# Patient Record
Sex: FEMALE | Race: WHITE | NOT HISPANIC OR LATINO | Employment: FULL TIME | URBAN - METROPOLITAN AREA
[De-identification: names, ages, dates, MRNs, and addresses within clinical notes are randomized per-mention and may not be internally consistent; named-entity substitution may affect disease eponyms.]

---

## 2018-04-12 NOTE — PROGRESS NOTES
SUBJECTIVE:   CC: Madiha Maldonado is an 28 year old woman who presents for preventive health visit.     Physical   Annual:     Getting at least 3 servings of Calcium per day::  Yes    Bi-annual eye exam::  NO    Dental care twice a year::  Yes    Sleep apnea or symptoms of sleep apnea::  None    Diet::  Regular (no restrictions)    Frequency of exercise::  2-3 days/week    Duration of exercise::  30-45 minutes    Taking medications regularly::  Yes    Medication side effects::  None    Additional concerns today::  YES          She reports that she lost about 40 pounds with diet and exercise changes. She also quit smoking about a year ago.     Vaginal Symptoms  Onset: Last Saturday     Description: bleeding after intercourse   Vaginal Discharge: none   Itching (Pruritis): no   Burning sensation:  no   Odor: no     Accompanying Signs & Symptoms:  Pain with Urination: no   Abdominal Pain: YES, cramping   Fever: no     History:   Sexually active: YES  New Partner: no   Possibility of Pregnancy:  No    Precipitating factors:   Recent Antibiotic Use: no     Alleviating factors:  None     Therapies Tried and outcome: No therapies tired.     She noticed that starting last Saturday she had some bleeding, it stopped on Sunday. It had happened once before about a year ago. It felt similar to period pain for her. She first noticed it after intercourse, which wasn't painful, only a little bit uncomfortable.       Today's PHQ-2 Score:   PHQ-2 ( 1999 Pfizer) 4/17/2018   Q1: Little interest or pleasure in doing things 0   Q2: Feeling down, depressed or hopeless 0   PHQ-2 Score 0   Q1: Little interest or pleasure in doing things Not at all   Q2: Feeling down, depressed or hopeless Not at all   PHQ-2 Score 0     Abuse: Current or Past(Physical, Sexual or Emotional)- No  Do you feel safe in your environment - Yes    Social History   Substance Use Topics     Smoking status: Former Smoker     Packs/day: 0.50     Years: 3.00     Types:  Cigarettes     Quit date: 2017     Smokeless tobacco: Never Used     Alcohol use Yes      Comment: 1 x per week       Reviewed orders with patient.  Reviewed health maintenance and updated orders accordingly - Yes  BP Readings from Last 3 Encounters:   04/17/18 118/86   10/09/15 124/86   06/08/15 120/84    Wt Readings from Last 3 Encounters:   04/17/18 84.1 kg (185 lb 6.4 oz)   10/09/15 102.4 kg (225 lb 11.2 oz)   06/08/15 101.9 kg (224 lb 11.2 oz)         Mammogram not appropriate for this patient based on age.    Pertinent mammograms are reviewed under the imaging tab.  History of abnormal Pap smear: NO - age 30- 65 PAP every 3 years recommended    Reviewed and updated as needed this visit by clinical staff  Tobacco  Allergies  Meds  Med Hx  Surg Hx  Fam Hx  Soc Hx      Reviewed and updated as needed this visit by Provider  Meds        Past Medical History:   Diagnosis Date     Intermittent asthma     Secondary to infectious cause.     Seasonal allergic rhinitis       Past Surgical History:   Procedure Laterality Date     CYSTECTOMY PILONIDAL  01/20/2014    [facility]     TONSILLECTOMY         Review of Systems  C: NEGATIVE for fever, chills, change in weight  I: NEGATIVE for worrisome rashes, moles or lesions  E: NEGATIVE for vision changes or irritation  ENT: NEGATIVE for ear, mouth and throat problems  R: NEGATIVE for significant cough or SOB  B: NEGATIVE for masses, tenderness or discharge  CV: NEGATIVE for chest pain, palpitations or peripheral edema  GI: NEGATIVE for nausea, abdominal pain, heartburn, or change in bowel habits  : POSITIVE for abnormal vaginal bleeding NEGATIVE for unusual urinary symptoms. Doesn't get a period with the Nexplanon.  M: NEGATIVE for significant arthralgias or myalgia  N: NEGATIVE for weakness, dizziness or paresthesias  P: NEGATIVE for changes in mood or affect    This document serves as a record of the services and decisions personally performed and  "made by Shraddha Quinones MD. It was created on her behalf by Lizett Lombardi, a trained medical scribe. The creation of this document is based the provider's statements to the medical scribe.  Lizett Lombardi, April 17, 2018 11:50 AM      OBJECTIVE:   /86  Pulse 73  Temp 97.7  F (36.5  C) (Temporal)  Resp 18  Ht 1.613 m (5' 3.5\")  Wt 84.1 kg (185 lb 6.4 oz)  SpO2 100%  BMI 32.33 kg/m2  Physical Exam  GENERAL: healthy, alert and no distress  EYES: Eyes grossly normal to inspection, PERRL and conjunctivae and sclerae normal  HENT: ear canals and TM's normal, nose and mouth without ulcers or lesions  NECK: no adenopathy, no asymmetry, masses, or scars and thyroid normal to palpation  RESP: lungs clear to auscultation - no rales, rhonchi or wheezes  BREAST: normal without masses, tenderness or nipple discharge and no palpable axillary masses or adenopathy  CV: regular rate and rhythm, normal S1 S2, no S3 or S4, no murmur, click or rub, no peripheral edema and peripheral pulses strong  ABDOMEN: soft, nontender, no hepatosplenomegaly, no masses and bowel sounds normal   (female): normal female external genitalia, normal urethral meatus, vaginal mucosa pink, moist, well rugated, and normal cervix/adnexa/uterus without masses or discharge, some slight bleeding noted from cervix with pap.   MS: no gross musculoskeletal defects noted, no edema  SKIN: no suspicious lesions or rashes  NEURO: Normal strength and tone, mentation intact and speech normal  PSYCH: mentation appears normal, affect normal/bright    ASSESSMENT/PLAN:   1. Encounter for routine adult health examination without abnormal findings    2. Screening for malignant neoplasm of cervix  Due for her PAP smear. Will repeat in 5 years if PAP and HPV are normal.   - Pap imaged thin layer screen with HPV - recommended age 30 - 65 years (select HPV order below)  - HPV High Risk Types DNA Cervical    3. Tobacco use disorder  Quit smoking about a year ago.     4. " "Screen for STD (sexually transmitted disease)  Will check wet prep and GC/Chlamydia. Patient will be informed of the results of these tests.   - Chlamydia trachomatis PCR  - Neisseria gonorrhoeae PCR  - Wet prep    5. Mild intermittent asthma without complication  Pt doing well on current medication and treatment plan. No change at this time.    6. Vaginal bleeding  Will check wet prep and GC/Chlamydia. If labs are normal will monitor on her own and return if symptoms worsen, and bleeding reoccurs.  - Chlamydia trachomatis PCR  - Neisseria gonorrhoeae PCR  - Wet prep    7. Gastroesophageal reflux disease without esophagitis  Pt doing well on current medication and treatment plan. No change at this time.  - omeprazole (PRILOSEC) 20 MG CR capsule; Take 1 capsule (20 mg) by mouth daily  Dispense: 90 capsule; Refill: 3     See Counseling messages    COUNSELING:  Reviewed preventive health counseling, as reflected in patient instructions   Quit smoking about a year ago.  reports that she quit smoking about 15 months ago. Her smoking use included Cigarettes. She has a 1.50 pack-year smoking history. She has never used smokeless tobacco.  Estimated body mass index is 32.33 kg/(m^2) as calculated from the following:    Height as of this encounter: 1.613 m (5' 3.5\").    Weight as of this encounter: 84.1 kg (185 lb 6.4 oz).   Weight management plan: Discussed healthy diet and exercise guidelines and patient will follow up in 12 months in clinic to re-evaluate.    Counseling Resources:  ATP IV Guidelines  Pooled Cohorts Equation Calculator  Breast Cancer Risk Calculator  FRAX Risk Assessment  ICSI Preventive Guidelines  Dietary Guidelines for Americans, 2010  USDA's MyPlate  ASA Prophylaxis  Lung CA Screening    The information in this document, created by the medical scribe for me, accurately reflects the services I personally performed and the decisions made by me. I have reviewed and approved this document for accuracy. "     DOLORES CHESTER MD, MD  [facility]

## 2018-04-17 ENCOUNTER — OFFICE VISIT (OUTPATIENT)
Dept: FAMILY MEDICINE | Facility: CLINIC | Age: 28
End: 2018-04-17
Payer: COMMERCIAL

## 2018-04-17 VITALS
WEIGHT: 185.4 LBS | DIASTOLIC BLOOD PRESSURE: 86 MMHG | TEMPERATURE: 97.7 F | RESPIRATION RATE: 18 BRPM | SYSTOLIC BLOOD PRESSURE: 118 MMHG | HEART RATE: 73 BPM | BODY MASS INDEX: 31.65 KG/M2 | OXYGEN SATURATION: 100 % | HEIGHT: 64 IN

## 2018-04-17 DIAGNOSIS — Z11.3 SCREEN FOR STD (SEXUALLY TRANSMITTED DISEASE): ICD-10-CM

## 2018-04-17 DIAGNOSIS — J45.20 MILD INTERMITTENT ASTHMA WITHOUT COMPLICATION: ICD-10-CM

## 2018-04-17 DIAGNOSIS — Z12.4 SCREENING FOR MALIGNANT NEOPLASM OF CERVIX: ICD-10-CM

## 2018-04-17 DIAGNOSIS — Z00.00 ENCOUNTER FOR ROUTINE ADULT HEALTH EXAMINATION WITHOUT ABNORMAL FINDINGS: Primary | ICD-10-CM

## 2018-04-17 DIAGNOSIS — K21.9 GASTROESOPHAGEAL REFLUX DISEASE WITHOUT ESOPHAGITIS: ICD-10-CM

## 2018-04-17 DIAGNOSIS — F17.200 TOBACCO USE DISORDER: ICD-10-CM

## 2018-04-17 DIAGNOSIS — N93.9 VAGINAL BLEEDING: ICD-10-CM

## 2018-04-17 LAB
SPECIMEN SOURCE: NORMAL
WET PREP SPEC: NORMAL

## 2018-04-17 PROCEDURE — 87624 HPV HI-RISK TYP POOLED RSLT: CPT | Performed by: FAMILY MEDICINE

## 2018-04-17 PROCEDURE — 87591 N.GONORRHOEAE DNA AMP PROB: CPT | Performed by: FAMILY MEDICINE

## 2018-04-17 PROCEDURE — 87491 CHLMYD TRACH DNA AMP PROBE: CPT | Performed by: FAMILY MEDICINE

## 2018-04-17 PROCEDURE — 99395 PREV VISIT EST AGE 18-39: CPT | Performed by: FAMILY MEDICINE

## 2018-04-17 PROCEDURE — 87210 SMEAR WET MOUNT SALINE/INK: CPT | Performed by: FAMILY MEDICINE

## 2018-04-17 PROCEDURE — G0145 SCR C/V CYTO,THINLAYER,RESCR: HCPCS | Performed by: FAMILY MEDICINE

## 2018-04-17 ASSESSMENT — PAIN SCALES - GENERAL: PAINLEVEL: NO PAIN (0)

## 2018-04-17 NOTE — MR AVS SNAPSHOT
After Visit Summary   4/17/2018    Madiha Maldonado    MRN: [id number]           Patient Information     Date Of Birth          1990        Visit Information        Provider Department      4/17/2018 11:20 AM Shraddha Quinones MD [facility]        Today's Diagnoses     Encounter for routine adult health examination without abnormal findings    -  1    Screening for malignant neoplasm of cervix        Tobacco use disorder        Screen for STD (sexually transmitted disease)        Mild intermittent asthma without complication        Vaginal bleeding        Gastroesophageal reflux disease without esophagitis          Care Instructions      Preventive Health Recommendations  Female Ages 26 - 39  Yearly exam:   See your health care provider every year in order to    Review health changes.     Discuss preventive care.      Review your medicines if you your doctor has prescribed any.    Until age 30: Get a Pap test every three years (more often if you have had an abnormal result).    After age 30: Talk to your doctor about whether you should have a Pap test every 3 years or have a Pap test with HPV screening every 5 years.   You do not need a Pap test if your uterus was removed (hysterectomy) and you have not had cancer.  You should be tested each year for STDs (sexually transmitted diseases), if you're at risk.   Talk to your provider about how often to have your cholesterol checked.  If you are at risk for diabetes, you should have a diabetes test (fasting glucose).  Shots: Get a flu shot each year. Get a tetanus shot every 10 years.   Nutrition:     Eat at least 5 servings of fruits and vegetables each day.    Eat whole-grain bread, whole-wheat pasta and brown rice instead of white grains and rice.    Talk to your provider about Calcium and Vitamin D.     Lifestyle    Exercise at least 150 minutes a week (30 minutes a day, 5 days of the week). This will help you control your weight and  "prevent disease.    Limit alcohol to one drink per day.    No smoking.     Wear sunscreen to prevent skin cancer.    See your dentist every six months for an exam and cleaning.            Follow-ups after your visit        Follow-up notes from your care team     Return in about 1 year (around 4/17/2019) for Physical Exam.      Who to contact     If you have questions or need follow up information about today's clinic visit or your schedule please contact [facility] [place] directly at [phone].  Normal or non-critical lab and imaging results will be communicated to you by CrossFiberhart, letter or phone within 4 business days after the clinic has received the results. If you do not hear from us within 7 days, please contact the clinic through Pineventt or phone. If you have a critical or abnormal lab result, we will notify you by phone as soon as possible.  Submit refill requests through [employer] Management or call your pharmacy and they will forward the refill request to us. Please allow 3 business days for your refill to be completed.          Additional Information About Your Visit        CrossFiberharION Signature Information     [employer] gives you secure access to your electronic health record. If you see a primary care provider, you can also send messages to your care team and make appointments. If you have questions, please call your primary care clinic.  If you do not have a primary care provider, please call [phone] and they will assist you.        Care EveryWhere ID     This is your Care EveryWhere ID. This could be used by other organizations to access your [facility] medical records  [id number]        Your Vitals Were     Pulse Temperature Respirations Height Pulse Oximetry BMI (Body Mass Index)    73 97.7  F (36.5  C) (Temporal) 18 5' 3.5\" (1.613 m) 100% 32.33 kg/m2       Blood Pressure from Last 3 Encounters:   04/17/18 118/86   10/09/15 124/86   06/08/15 120/84    Weight from Last 3 Encounters:   04/17/18 185 lb 6.4 oz (84.1 " kg)   10/09/15 225 lb 11.2 oz (102.4 kg)   06/08/15 224 lb 11.2 oz (101.9 kg)              We Performed the Following     Chlamydia trachomatis PCR     HPV High Risk Types DNA Cervical     Neisseria gonorrhoeae PCR     Pap imaged thin layer screen with HPV - recommended age 30 - 65 years (select HPV order below)     Wet prep          Today's Medication Changes          These changes are accurate as of 4/17/18 11:53 AM.  If you have any questions, ask your nurse or doctor.               These medicines have changed or have updated prescriptions.        Dose/Directions    omeprazole 20 MG CR capsule   Commonly known as:  priLOSEC   This may have changed:    - additional instructions  - Another medication with the same name was removed. Continue taking this medication, and follow the directions you see here.   Used for:  Gastroesophageal reflux disease without esophagitis   Changed by:  Shraddha Quinones MD        Dose:  20 mg   Take 1 capsule (20 mg) by mouth daily   Quantity:  90 capsule   Refills:  3         Stop taking these medicines if you haven't already. Please contact your care team if you have questions.     cyclobenzaprine 10 MG tablet   Commonly known as:  FLEXERIL   Stopped by:  Shraddha Quinones MD           fluticasone 50 MCG/ACT spray   Commonly known as:  FLONASE   Stopped by:  Shraddha Quinones MD           norgestimate-ethinyl estradiol 0.25-35 MG-MCG per tablet   Commonly known as:  SPRINTEC 28   Stopped by:  Shraddha Quinones MD                Where to get your medicines      These medications were sent to [id number] - [place] - [address] [place] [address]  [address], [place] [address]     Phone:  [phone]     omeprazole 20 MG CR capsule                Primary Care Provider Office Phone # Fax #    Shraddha Quinones [phone] [address]  [place] [address]        Equal Access to Services     [facility] KATLIN : anabella Burch,  nain batesdaviddina serratodarcy crow lucretiain hayaan adeeg kharash la'aan ah. So [facility] [phone].    ATENCIÓN: Si calderon pinzon, tiene a monge disposición servicios gratuitos de asistencia lingüística. Binu al [phone].    We comply with applicable federal civil rights laws and [place] laws. We do not discriminate on the basis of race, color, national origin, age, disability, sex, sexual orientation, or gender identity.            Thank you!     Thank you for choosing [facility]  for your care. Our goal is always to provide you with excellent care. Hearing back from our patients is one way we can continue to improve our services. Please take a few minutes to complete the written survey that you may receive in the mail after your visit with us. Thank you!             Your Updated Medication List - Protect others around you: Learn how to safely use, store and throw away your medicines at www.disposemymeds.org.          This list is accurate as of 4/17/18 11:53 AM.  Always use your most recent med list.                   Brand Name Dispense Instructions for use Diagnosis    albuterol 108 (90 Base) MCG/ACT Inhaler    VENTOLIN HFA    18 g    inhale 2 puffs by mouth every 4 hours as needed    Intermittent asthma, uncomplicated       omeprazole 20 MG CR capsule    priLOSEC    90 capsule    Take 1 capsule (20 mg) by mouth daily    Gastroesophageal reflux disease without esophagitis       triamcinolone 0.1 % lotion    KENALOG    60 mL    Apply sparingly to affected area three times daily as needed.    Dermatitis       ZYRTEC PO      Take  by mouth.

## 2018-04-17 NOTE — LETTER
My Asthma Action Plan  Name: Madiha Maldonado   YOB: 1990  Date: 5/7/2018   My doctor: DOLORES CHESTER MD, MD   [facility]        My Control Medicine: None  My Rescue Medicine: Albuterol (Proair/Ventolin/Proventil) inhaler     My Asthma Severity: intermittent  Avoid your asthma triggers: upper respiratory infections               GREEN ZONE   Good Control    I feel good    No cough or wheeze    Can work, sleep and play without asthma symptoms       Take your asthma control medicine every day.     1. If exercise triggers your asthma, take your rescue medication    15 minutes before exercise or sports, and    During exercise if you have asthma symptoms  2. Spacer to use with inhaler: If you have a spacer, make sure to use it with your inhaler             YELLOW ZONE Getting Worse  I have ANY of these:    I do not feel good    Cough or wheeze    Chest feels tight    Wake up at night   1. Keep taking your Green Zone medications  2. Start taking your rescue medicine:    every 20 minutes for up to 1 hour. Then every 4 hours for 24-48 hours.  3. If you stay in the Yellow Zone for more than 12-24 hours, contact your doctor.  4. If you do not return to the Green Zone in 12-24 hours or you get worse, start taking your oral steroid medicine if prescribed by your provider.           RED ZONE Medical Alert - Get Help  I have ANY of these:    I feel awful    Medicine is not helping    Breathing getting harder    Trouble walking or talking    Nose opens wide to breathe       1. Take your rescue medicine NOW  2. If your provider has prescribed an oral steroid medicine, start taking it NOW  3. Call your doctor NOW  4. If you are still in the Red Zone after 20 minutes and you have not reached your doctor:    Take your rescue medicine again and    Call 911 or go to the emergency room right away    See your regular doctor within 2 weeks of an Emergency Room or Urgent Care visit for follow-up  treatment.          Annual Reminders:  Meet with Asthma Educator,  Flu Shot in the Fall, consider Pneumonia Vaccination for patients with asthma (aged 19 and older).    Pharmacy:    [id number] - [place], [place] - [address] [id number] - [place], [place] - [address]                      Asthma Triggers  How To Control Things That Make Your Asthma Worse    Triggers are things that make your asthma worse.  Look at the list below to help you find your triggers and what you can do about them.  You can help prevent asthma flare-ups by staying away from your triggers.      Trigger                                                          What you can do   Cigarette Smoke  Tobacco smoke can make asthma worse. Do not allow smoking in your home, car or around you.  Be sure no one smokes at a child s day care or school.  If you smoke, ask your health care provider for ways to help you quit.  Ask family members to quit too.  Ask your health care provider for a referral to Quit Plan to help you quit smoking, or call 4-357-755-PLAN.     Colds, Flu, Bronchitis  These are common triggers of asthma. Wash your hands often.  Don t touch your eyes, nose or mouth.  Get a flu shot every year.     Dust Mites  These are tiny bugs that live in cloth or carpet. They are too small to see. Wash sheets and blankets in hot water every week.   Encase pillows and mattress in dust mite proof covers.  Avoid having carpet if you can. If you have carpet, vacuum weekly.   Use a dust mask and HEPA vacuum.   Pollen and Outdoor Mold  Some people are allergic to trees, grass, or weed pollen, or molds. Try to keep your windows closed.  Limit time out doors when pollen count is high.   Ask you health care provider about taking medicine during allergy season.     Animal Dander  Some people are allergic to skin flakes, urine or saliva from pets with fur or feathers. Keep pets with fur or feathers out of your home.    If you can t keep  the pet outdoors, then keep the pet out of your bedroom.  Keep the bedroom door closed.  Keep pets off cloth furniture and away from stuffed toys.     Mice, Rats, and Cockroaches  Some people are allergic to the waste from these pests.   Cover food and garbage.  Clean up spills and food crumbs.  Store grease in the refrigerator.   Keep food out of the bedroom.   Indoor Mold  This can be a trigger if your home has high moisture. Fix leaking faucets, pipes, or other sources of water.   Clean moldy surfaces.  Dehumidify basement if it is damp and smelly.   Smoke, Strong Odors, and Sprays  These can reduce air quality. Stay away from strong odors and sprays, such as perfume, powder, hair spray, paints, smoke incense, paint, cleaning products, candles and new carpet.   Exercise or Sports  Some people with asthma have this trigger. Be active!  Ask your doctor about taking medicine before sports or exercise to prevent symptoms.    Warm up for 5-10 minutes before and after sports or exercise.     Other Triggers of Asthma  Cold air:  Cover your nose and mouth with a scarf.  Sometimes laughing or crying can be a trigger.  Some medicines and food can trigger asthma.

## 2018-04-18 LAB
C TRACH DNA SPEC QL NAA+PROBE: NEGATIVE
N GONORRHOEA DNA SPEC QL NAA+PROBE: NEGATIVE
SPECIMEN SOURCE: NORMAL
SPECIMEN SOURCE: NORMAL

## 2018-04-18 ASSESSMENT — ASTHMA QUESTIONNAIRES: ACT_TOTALSCORE: 25

## 2018-04-19 LAB
COPATH REPORT: NORMAL
PAP: NORMAL

## 2018-04-23 LAB
FINAL DIAGNOSIS: NORMAL
HPV HR 12 DNA CVX QL NAA+PROBE: NEGATIVE
HPV16 DNA SPEC QL NAA+PROBE: NEGATIVE
HPV18 DNA SPEC QL NAA+PROBE: NEGATIVE
SPECIMEN DESCRIPTION: NORMAL
SPECIMEN SOURCE CVX/VAG CYTO: NORMAL

## 2020-03-01 ENCOUNTER — HEALTH MAINTENANCE LETTER (OUTPATIENT)
Age: 30
End: 2020-03-01

## 2020-12-14 ENCOUNTER — HEALTH MAINTENANCE LETTER (OUTPATIENT)
Age: 30
End: 2020-12-14

## 2021-04-17 ENCOUNTER — HEALTH MAINTENANCE LETTER (OUTPATIENT)
Age: 31
End: 2021-04-17

## 2021-10-02 ENCOUNTER — HEALTH MAINTENANCE LETTER (OUTPATIENT)
Age: 31
End: 2021-10-02

## 2022-01-10 ENCOUNTER — OFFICE VISIT (OUTPATIENT)
Dept: URGENT CARE | Facility: URGENT CARE | Age: 32
End: 2022-01-10
Payer: COMMERCIAL

## 2022-01-10 VITALS
SYSTOLIC BLOOD PRESSURE: 134 MMHG | HEART RATE: 74 BPM | DIASTOLIC BLOOD PRESSURE: 84 MMHG | WEIGHT: 171.2 LBS | BODY MASS INDEX: 29.85 KG/M2 | OXYGEN SATURATION: 99 % | TEMPERATURE: 98 F

## 2022-01-10 DIAGNOSIS — J02.9 SORE THROAT: Primary | ICD-10-CM

## 2022-01-10 LAB
DEPRECATED S PYO AG THROAT QL EIA: NEGATIVE
GROUP A STREP BY PCR: NOT DETECTED

## 2022-01-10 PROCEDURE — 99203 OFFICE O/P NEW LOW 30 MIN: CPT | Performed by: NURSE PRACTITIONER

## 2022-01-10 PROCEDURE — 87651 STREP A DNA AMP PROBE: CPT | Performed by: NURSE PRACTITIONER

## 2022-05-14 ENCOUNTER — HEALTH MAINTENANCE LETTER (OUTPATIENT)
Age: 32
End: 2022-05-14

## 2022-09-03 ENCOUNTER — HEALTH MAINTENANCE LETTER (OUTPATIENT)
Age: 32
End: 2022-09-03

## 2023-01-02 ENCOUNTER — OFFICE VISIT (OUTPATIENT)
Dept: FAMILY MEDICINE | Facility: CLINIC | Age: 33
End: 2023-01-02
Payer: COMMERCIAL

## 2023-01-02 VITALS
SYSTOLIC BLOOD PRESSURE: 129 MMHG | DIASTOLIC BLOOD PRESSURE: 84 MMHG | WEIGHT: 186.5 LBS | OXYGEN SATURATION: 98 % | BODY MASS INDEX: 31.84 KG/M2 | HEART RATE: 88 BPM | TEMPERATURE: 97.2 F | HEIGHT: 64 IN

## 2023-01-02 DIAGNOSIS — Z00.00 ROUTINE GENERAL MEDICAL EXAMINATION AT A HEALTH CARE FACILITY: Primary | ICD-10-CM

## 2023-01-02 DIAGNOSIS — J30.2 SEASONAL ALLERGIES: ICD-10-CM

## 2023-01-02 DIAGNOSIS — J45.20 INTERMITTENT ASTHMA, UNCOMPLICATED: ICD-10-CM

## 2023-01-02 DIAGNOSIS — Z12.4 CERVICAL CANCER SCREENING: ICD-10-CM

## 2023-01-02 DIAGNOSIS — Z72.0 TOBACCO USE: ICD-10-CM

## 2023-01-02 DIAGNOSIS — Z11.4 SCREENING FOR HIV (HUMAN IMMUNODEFICIENCY VIRUS): ICD-10-CM

## 2023-01-02 DIAGNOSIS — Z87.891 PERSONAL HISTORY OF TOBACCO USE, PRESENTING HAZARDS TO HEALTH: ICD-10-CM

## 2023-01-02 DIAGNOSIS — Z11.59 NEED FOR HEPATITIS C SCREENING TEST: ICD-10-CM

## 2023-01-02 DIAGNOSIS — B96.89 BACTERIAL VAGINOSIS: ICD-10-CM

## 2023-01-02 DIAGNOSIS — Z11.3 SCREEN FOR STD (SEXUALLY TRANSMITTED DISEASE): ICD-10-CM

## 2023-01-02 DIAGNOSIS — N76.0 BACTERIAL VAGINOSIS: ICD-10-CM

## 2023-01-02 LAB
CHOLEST SERPL-MCNC: 163 MG/DL
CLUE CELLS: PRESENT
ERYTHROCYTE [DISTWIDTH] IN BLOOD BY AUTOMATED COUNT: 12 % (ref 10–15)
FASTING STATUS PATIENT QL REPORTED: ABNORMAL
HBA1C MFR BLD: 5.2 % (ref 0–5.6)
HCT VFR BLD AUTO: 38.9 % (ref 35–47)
HCV AB SERPL QL IA: NONREACTIVE
HDLC SERPL-MCNC: 56 MG/DL
HGB BLD-MCNC: 13 G/DL (ref 11.7–15.7)
HIV 1+2 AB+HIV1 P24 AG SERPL QL IA: NONREACTIVE
LDLC SERPL CALC-MCNC: 65 MG/DL
MCH RBC QN AUTO: 30.1 PG (ref 26.5–33)
MCHC RBC AUTO-ENTMCNC: 33.4 G/DL (ref 31.5–36.5)
MCV RBC AUTO: 90 FL (ref 78–100)
NONHDLC SERPL-MCNC: 107 MG/DL
PLATELET # BLD AUTO: 286 10E3/UL (ref 150–450)
RBC # BLD AUTO: 4.32 10E6/UL (ref 3.8–5.2)
T PALLIDUM AB SER QL: NONREACTIVE
TRICHOMONAS, WET PREP: ABNORMAL
TRIGL SERPL-MCNC: 211 MG/DL
WBC # BLD AUTO: 6.6 10E3/UL (ref 4–11)
WBC'S/HIGH POWER FIELD, WET PREP: ABNORMAL
YEAST, WET PREP: ABNORMAL

## 2023-01-02 PROCEDURE — G0145 SCR C/V CYTO,THINLAYER,RESCR: HCPCS | Performed by: PHYSICIAN ASSISTANT

## 2023-01-02 PROCEDURE — 87491 CHLMYD TRACH DNA AMP PROBE: CPT | Performed by: PHYSICIAN ASSISTANT

## 2023-01-02 PROCEDURE — 90686 IIV4 VACC NO PRSV 0.5 ML IM: CPT | Performed by: PHYSICIAN ASSISTANT

## 2023-01-02 PROCEDURE — 86780 TREPONEMA PALLIDUM: CPT | Performed by: PHYSICIAN ASSISTANT

## 2023-01-02 PROCEDURE — 86803 HEPATITIS C AB TEST: CPT | Performed by: PHYSICIAN ASSISTANT

## 2023-01-02 PROCEDURE — 87389 HIV-1 AG W/HIV-1&-2 AB AG IA: CPT | Performed by: PHYSICIAN ASSISTANT

## 2023-01-02 PROCEDURE — 99395 PREV VISIT EST AGE 18-39: CPT | Mod: 25 | Performed by: PHYSICIAN ASSISTANT

## 2023-01-02 PROCEDURE — 83036 HEMOGLOBIN GLYCOSYLATED A1C: CPT | Performed by: PHYSICIAN ASSISTANT

## 2023-01-02 PROCEDURE — 90471 IMMUNIZATION ADMIN: CPT | Performed by: PHYSICIAN ASSISTANT

## 2023-01-02 PROCEDURE — 80061 LIPID PANEL: CPT | Performed by: PHYSICIAN ASSISTANT

## 2023-01-02 PROCEDURE — 90472 IMMUNIZATION ADMIN EACH ADD: CPT | Performed by: PHYSICIAN ASSISTANT

## 2023-01-02 PROCEDURE — 87624 HPV HI-RISK TYP POOLED RSLT: CPT | Performed by: PHYSICIAN ASSISTANT

## 2023-01-02 PROCEDURE — 87210 SMEAR WET MOUNT SALINE/INK: CPT | Performed by: PHYSICIAN ASSISTANT

## 2023-01-02 PROCEDURE — 90715 TDAP VACCINE 7 YRS/> IM: CPT | Performed by: PHYSICIAN ASSISTANT

## 2023-01-02 PROCEDURE — 99214 OFFICE O/P EST MOD 30 MIN: CPT | Mod: 25 | Performed by: PHYSICIAN ASSISTANT

## 2023-01-02 PROCEDURE — 87591 N.GONORRHOEAE DNA AMP PROB: CPT | Performed by: PHYSICIAN ASSISTANT

## 2023-01-02 PROCEDURE — 36415 COLL VENOUS BLD VENIPUNCTURE: CPT | Performed by: PHYSICIAN ASSISTANT

## 2023-01-02 PROCEDURE — 85027 COMPLETE CBC AUTOMATED: CPT | Performed by: PHYSICIAN ASSISTANT

## 2023-01-02 RX ORDER — METRONIDAZOLE 500 MG/1
500 TABLET ORAL 2 TIMES DAILY
Qty: 14 TABLET | Refills: 0 | Status: SHIPPED | OUTPATIENT
Start: 2023-01-02 | End: 2023-01-09

## 2023-01-02 RX ORDER — BUPROPION HYDROCHLORIDE 150 MG/1
150 TABLET ORAL EVERY MORNING
Qty: 90 TABLET | Refills: 1 | Status: SHIPPED | OUTPATIENT
Start: 2023-01-02 | End: 2023-05-26

## 2023-01-02 RX ORDER — ALBUTEROL SULFATE 90 UG/1
AEROSOL, METERED RESPIRATORY (INHALATION)
Qty: 18 G | Refills: 2 | Status: SHIPPED | OUTPATIENT
Start: 2023-01-02

## 2023-01-02 RX ORDER — DESLORATADINE 5 MG/1
5 TABLET ORAL DAILY
Qty: 90 TABLET | Refills: 3 | Status: SHIPPED | OUTPATIENT
Start: 2023-01-02 | End: 2023-05-26

## 2023-01-02 ASSESSMENT — ASTHMA QUESTIONNAIRES
QUESTION_3 LAST FOUR WEEKS HOW OFTEN DID YOUR ASTHMA SYMPTOMS (WHEEZING, COUGHING, SHORTNESS OF BREATH, CHEST TIGHTNESS OR PAIN) WAKE YOU UP AT NIGHT OR EARLIER THAN USUAL IN THE MORNING: NOT AT ALL
ACT_TOTALSCORE: 23
QUESTION_1 LAST FOUR WEEKS HOW MUCH OF THE TIME DID YOUR ASTHMA KEEP YOU FROM GETTING AS MUCH DONE AT WORK, SCHOOL OR AT HOME: NONE OF THE TIME
ACT_TOTALSCORE: 23
QUESTION_5 LAST FOUR WEEKS HOW WOULD YOU RATE YOUR ASTHMA CONTROL: WELL CONTROLLED
QUESTION_2 LAST FOUR WEEKS HOW OFTEN HAVE YOU HAD SHORTNESS OF BREATH: ONCE OR TWICE A WEEK
QUESTION_4 LAST FOUR WEEKS HOW OFTEN HAVE YOU USED YOUR RESCUE INHALER OR NEBULIZER MEDICATION (SUCH AS ALBUTEROL): NOT AT ALL

## 2023-01-02 ASSESSMENT — ENCOUNTER SYMPTOMS
HEMATOCHEZIA: 0
NERVOUS/ANXIOUS: 0
COUGH: 0
SHORTNESS OF BREATH: 0
HEADACHES: 0
BREAST MASS: 0
CONSTIPATION: 0
HEARTBURN: 0
FREQUENCY: 0
SORE THROAT: 0
DIZZINESS: 0
EYE PAIN: 0
WEAKNESS: 0
NAUSEA: 0
ABDOMINAL PAIN: 0
DYSURIA: 0
JOINT SWELLING: 0
HEMATURIA: 0
FEVER: 0
PARESTHESIAS: 0
PALPITATIONS: 0
DIARRHEA: 0
ARTHRALGIAS: 0
CHILLS: 0
MYALGIAS: 0

## 2023-01-02 ASSESSMENT — PAIN SCALES - GENERAL: PAINLEVEL: NO PAIN (0)

## 2023-01-02 NOTE — PROGRESS NOTES
SUBJECTIVE:   CC: Madiha is an 32 year old who presents for preventive health visit.     Healthy Habits:     Getting at least 3 servings of Calcium per day:  Yes    Bi-annual eye exam:  NO    Dental care twice a year:  Yes    Sleep apnea or symptoms of sleep apnea:  None    Diet:  Regular (no restrictions)    Frequency of exercise:  1 day/week    Duration of exercise:  15-30 minutes    Taking medications regularly:  Yes    PHQ-2 Total Score: 0    Additional concerns today:  Yes    DISCUSs SMOKING  --  Routine Health Maintenance:  Immunizations Due For: tdap, flu, covid  Fam hx of breast cancer: no   Fam hx of colon cancer: yes- paternal grandfather . Father w/polyps but otherwise normal scopes.    Fasting: no - espresso w/little creamer, beef stick   Lipids screening: last 2015  Diabetes screening: no fam hx, no prior pregnancies. Normal prior screening     GYN Hx:  Pap: Last: 04/2018- NIL, neg HPV. Prior Pap Hx: normal   Contraception: IUD- Mirena IUD- placed 05/05/2020 [facility]  Some bleeding with Mirena- sporadic. Not heavy   Sexually active: yes.   STD screening: yes, new partner   Denies GYN concerns of PCB, pelvic pain, dyspareunia, vaginal discharge    Intermittent asthma  hasn't had inhaler in years.  Albuterol only  Triggers- allergies, dog, viral.    Smoking-   Smoking since age 16.   Smoking half-pack a day,.   Quit for 18months a few years ago using wellbutrin. Tolerated it ok- but felt like it did something weird to her vision when looking at computer all day.       ACT Total Scores 10/9/2015 4/17/2018 1/2/2023   ACT TOTAL SCORE - - -   ASTHMA ER VISITS - - -   ASTHMA HOSPITALIZATIONS - - -   ACT TOTAL SCORE (Goal Greater than or Equal to 20) 21 25 23   In the past 12 months, how many times did you visit the emergency room for your asthma without being admitted to the hospital? 0 0 0   In the past 12 months, how many times were you hospitalized overnight because of your asthma? 0 0 0          Today's PHQ-2 Score:   PHQ-2 (  [employer]) 2023   Q1: Little interest or pleasure in doing things 0   Q2: Feeling down, depressed or hopeless 0   PHQ-2 Score 0   Q1: Little interest or pleasure in doing things Not at all   Q2: Feeling down, depressed or hopeless Not at all   PHQ-2 Score 0       Have you ever done Advance Care Planning? (For example, a Health Directive, POLST, or a discussion with a medical provider or your loved ones about your wishes): No, advance care planning information given to patient to review.  Patient declined advance care planning discussion at this time.    Social History     Tobacco Use     Smoking status: Every Day     Packs/day: 0.50     Years: 3.00     Pack years: 1.50     Types: Cigarettes     Last attempt to quit: 2017     Years since quittin.0     Smokeless tobacco: Never   Substance Use Topics     Alcohol use: Yes     Comment: 1 x per week       Alcohol Use 2023   Prescreen: >3 drinks/day or >7 drinks/week? No   Prescreen: >3 drinks/day or >7 drinks/week? -     Reviewed orders with patient.  Reviewed health maintenance and updated orders accordingly - Yes  Lab work is in process  Labs reviewed in EPIC  BP Readings from Last 3 Encounters:   23 129/84   01/10/22 134/84   18 118/86    Wt Readings from Last 3 Encounters:   23 84.6 kg (186 lb 8 oz)   01/10/22 77.7 kg (171 lb 3.2 oz)   18 84.1 kg (185 lb 6.4 oz)                  Patient Active Problem List   Diagnosis     CARDIOVASCULAR SCREENING; LDL GOAL LESS THAN 160     Seasonal allergies     Intermittent asthma     Tobacco use     Esophageal reflux     [employer]     BMI 39.0-39.9,adult     Past Surgical History:   Procedure Laterality Date     CYSTECTOMY PILONIDAL  2014    [facility]     TONSILLECTOMY         Social History     Tobacco Use     Smoking status: Every Day     Packs/day: 0.50     Years: 3.00     Pack years: 1.50     Types: Cigarettes     Last attempt  to quit: 2017     Years since quittin.0     Smokeless tobacco: Never   Substance Use Topics     Alcohol use: Yes     Comment: 1 x per week- 4 beverages per week     Family History   Problem Relation Age of Onset     No Known Problems Mother         healthy     Skin Cancer Father      Kidney Disease Father      Cancer Paternal Grandmother      Diabetes Paternal Grandfather      Colon Cancer Paternal Grandfather      Asthma No family hx of      C.A.D. No family hx of      Hypertension No family hx of      Cerebrovascular Disease No family hx of      Cancer - colorectal No family hx of      Alcohol/Drug No family hx of      Allergies No family hx of      Alzheimer Disease No family hx of      Anesthesia Reaction No family hx of      Arthritis No family hx of      Blood Disease No family hx of      Circulatory No family hx of      Connective Tissue Disorder No family hx of      Congenital Anomalies No family hx of      Cardiovascular No family hx of      Depression No family hx of      Endocrine Disease No family hx of      Eye Disorder No family hx of      Genetic Disorder No family hx of      Gastrointestinal Disease No family hx of      Gynecology No family hx of      Heart Disease No family hx of      Lipids No family hx of      Obesity No family hx of      Psychotic Disorder No family hx of      Respiratory No family hx of      Osteoporosis No family hx of      Neurologic Disorder No family hx of      Musculoskeletal Disorder No family hx of      Genitourinary Problems No family hx of      Hearing Loss No family hx of      Family History Negative No family hx of      Thyroid Disease No family hx of          Current Outpatient Medications   Medication Sig Dispense Refill     albuterol (VENTOLIN HFA) 108 (90 Base) MCG/ACT inhaler inhale 2 puffs by mouth every 4 hours as needed 18 g 2     buPROPion (WELLBUTRIN XL) 150 MG 24 hr tablet Take 1 tablet (150 mg) by mouth every morning 90 tablet 1     desloratadine  (CLARINEX) 5 MG tablet Take 1 tablet (5 mg) by mouth daily 90 tablet 3     triamcinolone (KENALOG) 0.1 % lotion Apply sparingly to affected area three times daily as needed. (Patient not taking: Reported on 4/17/2018) 60 mL 0     Allergies   Allergen Reactions     Cephalosporins Hives     Sulfa Drugs Hives       Breast Cancer Screening:  Any new diagnosis of family breast, ovarian, or bowel cancer? No    FHS-7:   Breast CA Risk Assessment (FHS-7) 1/2/2023   Did any of your first-degree relatives have breast or ovarian cancer? Unknown   Did any of your relatives have bilateral breast cancer? No   Did any man in your family have breast cancer? No   Did any woman in your family have breast and ovarian cancer? Yes   Did any woman in your family have breast cancer before age 50 y? No   Do you have 2 or more relatives with breast and/or ovarian cancer? No   Do you have 2 or more relatives with breast and/or bowel cancer? No       Patient under 40 years of age: Routine Mammogram Screening not recommended.   Pertinent mammograms are reviewed under the imaging tab.    History of abnormal Pap smear: NO - age 30-65 PAP every 5 years with negative HPV co-testing recommended  PAP / HPV Latest Ref Rng & Units 4/17/2018 10/9/2015 5/11/2012   PAP (Historical) - NIL NIL NIL   HPV16 NEG:Negative Negative - -   HPV18 NEG:Negative Negative - -   HRHPV NEG:Negative Negative - -     Reviewed and updated as needed this visit by clinical staff   Tobacco  Allergies  Meds              Reviewed and updated as needed this visit by Provider                     Review of Systems  CONSTITUTIONAL: NEGATIVE for fever, chills, change in weight  INTEGUMENTARU/SKIN: NEGATIVE for worrisome rashes, moles or lesions  EYES: NEGATIVE for vision changes or irritation  ENT: NEGATIVE for ear, mouth and throat problems  RESP: NEGATIVE for significant cough or SOB  BREAST: NEGATIVE for masses, tenderness or discharge  CV: NEGATIVE for chest pain,  "palpitations or peripheral edema  GI: NEGATIVE for nausea, abdominal pain, heartburn, or change in bowel habits  : NEGATIVE for unusual urinary or vaginal symptoms. Periods are regular.  MUSCULOSKELETAL: NEGATIVE for significant arthralgias or myalgia  NEURO: NEGATIVE for weakness, dizziness or paresthesias  PSYCHIATRIC: NEGATIVE for changes in mood or affect     OBJECTIVE:   /84 (BP Location: Left arm, Patient Position: Chair, Cuff Size: Adult Regular)   Pulse 88   Temp 97.2  F (36.2  C) (Temporal)   Ht 1.629 m (5' 4.13\")   Wt 84.6 kg (186 lb 8 oz)   LMP  (LMP Unknown)   SpO2 98%   Breastfeeding Unknown   BMI 31.88 kg/m    Physical Exam  GENERAL: healthy, alert and no distress  EYES: Eyes grossly normal to inspection, PERRL and conjunctivae and sclerae normal  HENT: ear canals and TM's normal, nose and mouth without ulcers or lesions  NECK: no adenopathy, no asymmetry, masses, or scars and thyroid normal to palpation  RESP: lungs clear to auscultation - no rales, rhonchi or wheezes  BREAST: normal without masses, tenderness or nipple discharge and no palpable axillary masses or adenopathy  CV: regular rate and rhythm, normal S1 S2, no S3 or S4, no murmur, click or rub, no peripheral edema and peripheral pulses strong  ABDOMEN: soft, nontender, no hepatosplenomegaly, no masses and bowel sounds normal   (female): normal female external genitalia, normal urethral meatus, vaginal mucosa pink, moist, well rugated, and normal cervix- IUD strings visible an appropriate length, bimanual adnexa/uterus without masses; moderate thin odorous discharge  MS: no gross musculoskeletal defects noted, no edema  SKIN: no suspicious lesions or rashes  NEURO: Normal strength and tone, mentation intact and speech normal  PSYCH: mentation appears normal, affect normal/bright  LYMPH: normal ant/post cervical, supraclavicular nodes    Diagnostic Test Results:  Labs reviewed in Epic  Results for orders placed or performed " in visit on 01/02/23   Hemoglobin A1c     Status: Normal   Result Value Ref Range    Hemoglobin A1C 5.2 0.0 - 5.6 %   CBC with platelets     Status: Normal   Result Value Ref Range    WBC Count 6.6 4.0 - 11.0 10e3/uL    RBC Count 4.32 3.80 - 5.20 10e6/uL    Hemoglobin 13.0 11.7 - 15.7 g/dL    Hematocrit 38.9 35.0 - 47.0 %    MCV 90 78 - 100 fL    MCH 30.1 26.5 - 33.0 pg    MCHC 33.4 31.5 - 36.5 g/dL    RDW 12.0 10.0 - 15.0 %    Platelet Count 286 150 - 450 10e3/uL   Wet prep - Clinic Collect     Status: Abnormal    Specimen: Vagina; Swab   Result Value Ref Range    Trichomonas Absent Absent    Yeast Absent Absent    Clue Cells Present (A) Absent    WBCs/high power field 2+ (A) None       ASSESSMENT/PLAN:       ICD-10-CM    1. Routine general medical examination at a health care facility  Z00.00 REVIEW OF HEALTH MAINTENANCE PROTOCOL ORDERS     TDAP VACCINE (Adacel, Boostrix)     INFLUENZA VACCINE IM > 6 MONTHS VALENT IIV4 (AFLURIA/FLUZONE)     Pap Screen with HPV - recommended age 30 - 65 years     Lipid panel reflex to direct LDL Non-fasting     Hemoglobin A1c     CBC with platelets     Lipid panel reflex to direct LDL Non-fasting     Hemoglobin A1c     CBC with platelets      2. Tobacco use  Z72.0 buPROPion (WELLBUTRIN XL) 150 MG 24 hr tablet      3. Personal history of tobacco use, presenting hazards to health  Z87.891 buPROPion (WELLBUTRIN XL) 150 MG 24 hr tablet      4. Intermittent asthma, uncomplicated  J45.20 albuterol (VENTOLIN HFA) 108 (90 Base) MCG/ACT inhaler      5. Seasonal allergies  J30.2 desloratadine (CLARINEX) 5 MG tablet      6. Bacterial vaginosis  N76.0 metroNIDAZOLE (FLAGYL) 500 MG tablet    B96.89       7. Cervical cancer screening  Z12.4 Pap Screen with HPV - recommended age 30 - 65 years      8. Screen for STD (sexually transmitted disease)  Z11.3 HIV Antigen Antibody Combo     Hepatitis C Screen Reflex to HCV RNA Quant and Genotype     Chlamydia trachomatis PCR     Treponema Abs w Reflex to  RPR and Titer     Wet prep - Clinic Collect     Neisseria gonorrhoeae PCR     HIV Antigen Antibody Combo     Hepatitis C Screen Reflex to HCV RNA Quant and Genotype     Treponema Abs w Reflex to RPR and Titer     CANCELED: Neisseria gonorrhoeae PCR      9. Screening for HIV (human immunodeficiency virus)  Z11.4 HIV Antigen Antibody Combo     HIV Antigen Antibody Combo      10. Need for hepatitis C screening test  Z11.59 Hepatitis C Screen Reflex to HCV RNA Quant and Genotype     Hepatitis C Screen Reflex to HCV RNA Quant and Genotype        1. Routine general medical examination at a health care facility  Reviewed VS and weight/BMI with pt   Immunizations:   updated - see orders in chart/below ; discussed and pt declined covid today- will return for it  Counseling as below   Health screenings/maintenance per orders/comments below  When applicable based on age, personal hx, fam hx, and RF- counseled on appropriate cancer screenings.   Discussed risk factors and recommendations for screening for Hep C and HIV - agrees     - REVIEW OF HEALTH MAINTENANCE PROTOCOL ORDERS  - TDAP VACCINE (Adacel, Boostrix)  - INFLUENZA VACCINE IM > 6 MONTHS VALENT IIV4 (AFLURIA/FLUZONE)  - Pap Screen with HPV - recommended age 30 - 65 years  - Lipid panel reflex to direct LDL Non-fasting; Future  - Hemoglobin A1c; Future  - CBC with platelets; Future  - Lipid panel reflex to direct LDL Non-fasting  - Hemoglobin A1c  - CBC with platelets    2. Tobacco use  3. Personal history of tobacco use, presenting hazards to health  Discussed cessation and options for Wellbutrin and chantix.   She previously was successful with Wellbutrin and would like to retry. She does not have seizure hx, eating disorder hx or alcohol use disorder.   She prefers once dialy dosing on XL vs BID dosing of SR formulation.   If not effective she would consider chantix  - buPROPion (WELLBUTRIN XL) 150 MG 24 hr tablet; Take 1 tablet (150 mg) by mouth every morning   "Dispense: 90 tablet; Refill: 1    4. Intermittent asthma, uncomplicated  AAP done today   ACT updated  Filled albuterol for prn use with URI and allergy flares  - albuterol (VENTOLIN HFA) 108 (90 Base) MCG/ACT inhaler; inhale 2 puffs by mouth every 4 hours as needed  Dispense: 18 g; Refill: 2    5. Seasonal allergies  Feels like zyrtec \"quit working\"  Will try clarinex as below   - desloratadine (CLARINEX) 5 MG tablet; Take 1 tablet (5 mg) by mouth daily  Dispense: 90 tablet; Refill: 3    6. Bacterial vaginosis  Treat BV as below   - metroNIDAZOLE (FLAGYL) 500 MG tablet; Take 1 tablet (500 mg) by mouth 2 times daily for 7 days  Dispense: 14 tablet; Refill: 0    7. Cervical cancer screening  Obtained   - Pap Screen with HPV - recommended age 30 - 65 years    8. Screen for STD (sexually transmitted disease)  Safe sex practices discussed and advised. STD screening as below.   - HIV Antigen Antibody Combo; Future  - Hepatitis C Screen Reflex to HCV RNA Quant and Genotype; Future  - Chlamydia trachomatis PCR  - Treponema Abs w Reflex to RPR and Titer; Future  - Wet prep - Clinic Collect  - Neisseria gonorrhoeae PCR  - HIV Antigen Antibody Combo  - Hepatitis C Screen Reflex to HCV RNA Quant and Genotype  - Treponema Abs w Reflex to RPR and Titer    9. Screening for HIV (human immunodeficiency virus)  - HIV Antigen Antibody Combo; Future  - HIV Antigen Antibody Combo    10. Need for hepatitis C screening test  - Hepatitis C Screen Reflex to HCV RNA Quant and Genotype; Future  - Hepatitis C Screen Reflex to HCV RNA Quant and Genotype    Patient has been advised of split billing requirements and indicates understanding: Yes      COUNSELING:  Reviewed preventive health counseling, as reflected in patient instructions       Regular exercise       Healthy diet/nutrition       Contraception       Osteoporosis prevention/bone health       Safe sex practices/STD prevention        She reports that she has been smoking cigarettes. " She has a 1.50 pack-year smoking history. She has never used smokeless tobacco.          Tamar Rodriguez PA-C  [facility]

## 2023-01-02 NOTE — LETTER
My Asthma Action Plan    Name: Madiha Maldonado   YOB: 1990  Date: 1/2/2023   My doctor: Tamar Rodriguez PA-C   My clinic: [facility]        My Rescue Medicine:   Albuterol inhaler (Proair/Ventolin/Proventil HFA)  2-4 puffs EVERY 4 HOURS as needed. Use a spacer if recommended by your provider.   My Asthma Severity:   Intermittent / Exercise Induced  Know your asthma triggers: viral, allergies, pet, dog             GREEN ZONE   Good Control    I feel good    No cough or wheeze    Can work, sleep and play without asthma symptoms       Take your asthma control medicine every day.     1. If exercise triggers your asthma, take your rescue medication    15 minutes before exercise or sports, and    During exercise if you have asthma symptoms  2. Spacer to use with inhaler: If you have a spacer, make sure to use it with your inhaler             YELLOW ZONE Getting Worse  I have ANY of these:    I do not feel good    Cough or wheeze    Chest feels tight    Wake up at night   1. Keep taking your Green Zone medications  2. Start taking your rescue medicine:    every 20 minutes for up to 1 hour. Then every 4 hours for 24-48 hours.  3. If you stay in the Yellow Zone for more than 12-24 hours, contact your doctor.  4. If you do not return to the Green Zone in 12-24 hours or you get worse, start taking your oral steroid medicine if prescribed by your provider.           RED ZONE Medical Alert - Get Help  I have ANY of these:    I feel awful    Medicine is not helping    Breathing getting harder    Trouble walking or talking    Nose opens wide to breathe       1. Take your rescue medicine NOW  2. If your provider has prescribed an oral steroid medicine, start taking it NOW  3. Call your doctor NOW  4. If you are still in the Red Zone after 20 minutes and you have not reached your doctor:    Take your rescue medicine again and    Call 911 or go to the emergency room right away    See your regular  doctor within 2 weeks of an Emergency Room or Urgent Care visit for follow-up treatment.          Annual Reminders:  Meet with Asthma Educator,  Flu Shot in the Fall, consider Pneumonia Vaccination for patients with asthma (aged 19 and older).    Pharmacy:    [facility] [id number] [facility] - [place] - [address] [id number] - [place] - [id number]    Electronically signed by Tamar Rodriguez PA-C   Date: 01/02/23                    Asthma Triggers  How To Control Things That Make Your Asthma Worse    Triggers are things that make your asthma worse.  Look at the list below to help you find your triggers and   what you can do about them. You can help prevent asthma flare-ups by staying away from your triggers.      Trigger                                                          What you can do   Cigarette Smoke  Tobacco smoke can make asthma worse. Do not allow smoking in your home, car or around you.  Be sure no one smokes at a child s day care or school.  If you smoke, ask your health care provider for ways to help you quit.  Ask family members to quit too.  Ask your health care provider for a referral to Quit Plan to help you quit smoking, or call 8-663-963-PLAN.     Colds, Flu, Bronchitis  These are common triggers of asthma. Wash your hands often.  Don t touch your eyes, nose or mouth.  Get a flu shot every year.     Dust Mites  These are tiny bugs that live in cloth or carpet. They are too small to see. Wash sheets and blankets in hot water every week.   Encase pillows and mattress in dust mite proof covers.  Avoid having carpet if you can. If you have carpet, vacuum weekly.   Use a dust mask and HEPA vacuum.   Pollen and Outdoor Mold  Some people are allergic to trees, grass, or weed pollen, or molds. Try to keep your windows closed.  Limit time out doors when pollen count is high.   Ask you health care provider about taking medicine during allergy season.     Animal Dander  Some people  are allergic to skin flakes, urine or saliva from pets with fur or feathers. Keep pets with fur or feathers out of your home.    If you can t keep the pet outdoors, then keep the pet out of your bedroom.  Keep the bedroom door closed.  Keep pets off cloth furniture and away from stuffed toys.     Mice, Rats, and Cockroaches  Some people are allergic to the waste from these pests.   Cover food and garbage.  Clean up spills and food crumbs.  Store grease in the refrigerator.   Keep food out of the bedroom.   Indoor Mold  This can be a trigger if your home has high moisture. Fix leaking faucets, pipes, or other sources of water.   Clean moldy surfaces.  Dehumidify basement if it is damp and smelly.   Smoke, Strong Odors, and Sprays  These can reduce air quality. Stay away from strong odors and sprays, such as perfume, powder, hair spray, paints, smoke incense, paint, cleaning products, candles and new carpet.   Exercise or Sports  Some people with asthma have this trigger. Be active!  Ask your doctor about taking medicine before sports or exercise to prevent symptoms.    Warm up for 5-10 minutes before and after sports or exercise.     Other Triggers of Asthma  Cold air:  Cover your nose and mouth with a scarf.  Sometimes laughing or crying can be a trigger.  Some medicines and food can trigger asthma.

## 2023-01-03 LAB
C TRACH DNA SPEC QL NAA+PROBE: NEGATIVE
N GONORRHOEA DNA SPEC QL NAA+PROBE: NEGATIVE

## 2023-01-04 LAB
BKR LAB AP GYN ADEQUACY: NORMAL
BKR LAB AP GYN INTERPRETATION: NORMAL
BKR LAB AP HPV REFLEX: NORMAL
BKR LAB AP PREVIOUS ABNORMAL: NORMAL
PATH REPORT.COMMENTS IMP SPEC: NORMAL
PATH REPORT.COMMENTS IMP SPEC: NORMAL
PATH REPORT.RELEVANT HX SPEC: NORMAL

## 2023-01-05 LAB
HUMAN PAPILLOMA VIRUS 16 DNA: NEGATIVE
HUMAN PAPILLOMA VIRUS 18 DNA: POSITIVE
HUMAN PAPILLOMA VIRUS FINAL DIAGNOSIS: ABNORMAL
HUMAN PAPILLOMA VIRUS OTHER HR: NEGATIVE

## 2023-01-06 ENCOUNTER — PATIENT OUTREACH (OUTPATIENT)
Dept: FAMILY MEDICINE | Facility: CLINIC | Age: 33
End: 2023-01-06

## 2023-01-06 DIAGNOSIS — R87.810 CERVICAL HIGH RISK HPV (HUMAN PAPILLOMAVIRUS) TEST POSITIVE: ICD-10-CM

## 2023-01-06 NOTE — TELEPHONE ENCOUNTER
1/2/23 NIL pap, + HR HPV # 18. Plan: [place] bef 4/2/23 1/6/23 left msg and sent mychart result note.

## 2023-02-24 ENCOUNTER — OFFICE VISIT (OUTPATIENT)
Dept: OBGYN | Facility: CLINIC | Age: 33
End: 2023-02-24
Payer: COMMERCIAL

## 2023-02-24 VITALS
DIASTOLIC BLOOD PRESSURE: 74 MMHG | HEART RATE: 74 BPM | WEIGHT: 185 LBS | BODY MASS INDEX: 31.62 KG/M2 | SYSTOLIC BLOOD PRESSURE: 118 MMHG | OXYGEN SATURATION: 98 %

## 2023-02-24 DIAGNOSIS — R87.810 CERVICAL HIGH RISK HPV (HUMAN PAPILLOMAVIRUS) TEST POSITIVE: Primary | ICD-10-CM

## 2023-02-24 PROCEDURE — 57456 ENDOCERV CURETTAGE W/SCOPE: CPT | Performed by: OBSTETRICS & GYNECOLOGY

## 2023-02-24 PROCEDURE — 88305 TISSUE EXAM BY PATHOLOGIST: CPT | Performed by: PATHOLOGY

## 2023-02-24 NOTE — PATIENT INSTRUCTIONS
If you have any questions regarding your visit, Please contact your care team.    [facility]: [phone]    To Schedule an Appointment 24/7  Call: [phone]   DO. Dana Sanchez -Surgery Scheduler  Kaylene - Surgery Scheduler    AYDE Rodriguez, AYDE Stevens, AYDE   [place]  Wednesday and Friday  8:30 a.m-5:00 p.m  [place]-Temporary  Monday 8:30 a.m-5:00 p.m  Typical Surgery day:  Tuesday [facility]  [address] [place] [address] [phone] Phone  [phone] Fax    Imaging Scheduling-All Locations [phone]    [facility]  [address] [place] [address]     Urgent Care locations:  [facility] Monday-Friday   10 am - 8 pm  Saturday and Sunday   9 am - 5 pm [phone]   **Surgeries** Our Surgery Schedulers will contact you to schedule. If you do not receive a call within 3 business days, please call [phone].    [facility] Labor and Delivery:  [phone]    If you need a medication refill, please contact your pharmacy. Please allow 3 business days for your refill to be completed.  As always, Thank you for trusting us with your healthcare needs!  see additional instructions from your care team below    
Opt out

## 2023-02-24 NOTE — PROGRESS NOTES
This 32 y/o female, , using the Mirena IUD for contraception, presents as a new patient to the [facility] gyn dept for colposcopy examination.  Her recent pap result  was normal but her DNA marker test was + for high-risk HPV subtype #18 on 2023.  Informed consent was reviewed and obtained for the procedure and all her questions and concerns were addressed.  /74 (BP Location: Right arm, Patient Position: Chair, Cuff Size: Adult Regular)   Pulse 74   Wt 83.9 kg (185 lb)   LMP  (LMP Unknown)   SpO2 98%   Breastfeeding No   BMI 31.62 kg/m    ROS:  10 systems were reviewed and the positives were listed under problems.  In the dorsal lithotomy position, a bi-valve speculum was placed and her cervix was soaked with 3% acetic acid.  After several minutes, this fluid was removed and colposcopy examination was performed.  No areas of acetowhite discoloration were noted to involve the ectocervix so the endocervical curettage was performed with care to avoid disrupting her current IUD and strings.  This was a satisfactory colposcopy exam and her IUD remained in place.  There were no complications and all instruments were removed.  Counts were correct and follow up instructions and care were discussed.  Assessment - Colposcopy exam with biopsy due to + high-risk HPV #18 per DNA marker test  Plan - Submit the ECC to pathology.  Follow up care was discussed and if results are normal from today's biopsy, then the plan is to repeat her co-test in 1 year.  20 minutes were spent today in chart review, the patient visit, review of tests, and documentation in regard to the issues noted above.  This did NOT include the time spent in the procedure (colposcopy with ECC).

## 2023-02-28 LAB
PATH REPORT.COMMENTS IMP SPEC: NORMAL
PATH REPORT.COMMENTS IMP SPEC: NORMAL
PATH REPORT.FINAL DX SPEC: NORMAL
PATH REPORT.GROSS SPEC: NORMAL
PATH REPORT.MICROSCOPIC SPEC OTHER STN: NORMAL
PATH REPORT.RELEVANT HX SPEC: NORMAL
PHOTO IMAGE: NORMAL

## 2023-03-16 ENCOUNTER — PATIENT OUTREACH (OUTPATIENT)
Dept: OBGYN | Facility: CLINIC | Age: 33
End: 2023-03-16
Payer: COMMERCIAL

## 2023-05-26 ENCOUNTER — LAB (OUTPATIENT)
Dept: FAMILY MEDICINE | Facility: CLINIC | Age: 33
End: 2023-05-26
Attending: FAMILY MEDICINE
Payer: COMMERCIAL

## 2023-05-26 ENCOUNTER — VIRTUAL VISIT (OUTPATIENT)
Dept: FAMILY MEDICINE | Facility: CLINIC | Age: 33
End: 2023-05-26
Payer: COMMERCIAL

## 2023-05-26 DIAGNOSIS — J01.90 ACUTE NON-RECURRENT SINUSITIS, UNSPECIFIED LOCATION: ICD-10-CM

## 2023-05-26 DIAGNOSIS — J02.9 SORE THROAT: Primary | ICD-10-CM

## 2023-05-26 DIAGNOSIS — J02.9 SORE THROAT: ICD-10-CM

## 2023-05-26 LAB — DEPRECATED S PYO AG THROAT QL EIA: POSITIVE

## 2023-05-26 PROCEDURE — 87880 STREP A ASSAY W/OPTIC: CPT

## 2023-05-26 PROCEDURE — 87635 SARS-COV-2 COVID-19 AMP PRB: CPT

## 2023-05-26 PROCEDURE — 99213 OFFICE O/P EST LOW 20 MIN: CPT | Mod: VID | Performed by: FAMILY MEDICINE

## 2023-05-26 NOTE — PROGRESS NOTES
"Madiha is a 33 year old who is being evaluated via a billable video visit.      How would you like to obtain your AVS? MyChart  If the video visit is dropped, the invitation should be resent by: Text to cell phone: [phone]  Will anyone else be joining your video visit? No          Assessment & Plan     Sore throat  - Streptococcus A Rapid Screen w/Reflex to PCR - Clinic Collect; Future  - Symptomatic COVID-19 Virus (Coronavirus) by PCR; Future    Acute non-recurrent sinusitis, unspecified location  Will test for strep and COVID.  She is at day 6 of symptoms, so with the long weekend coming up I will provide her with a prescription that she can use if she is not getting better, or if the strep test comes back positive. Recommended she wait until the results come back, or until Sunday/Monday to start antibiotics.  Continue conservative cares, follow up if not improving.  - Streptococcus A Rapid Screen w/Reflex to PCR - Clinic Collect; Future  - Symptomatic COVID-19 Virus (Coronavirus) by PCR; Future  - amoxicillin-clavulanate (AUGMENTIN) 875-125 MG tablet; Take 1 tablet by mouth 2 times daily for 10 days    12 minutes spent by me on the date of the encounter doing chart review, history and exam, documentation and further activities per the note       Nicotine/Tobacco Cessation:  She reports that she has been smoking cigarettes. She has a 1.50 pack-year smoking history. She has never used smokeless tobacco.  Nicotine/Tobacco Cessation Plan:         BMI:   Estimated body mass index is 31.62 kg/m  as calculated from the following:    Height as of 1/2/23: 1.629 m (5' 4.13\").    Weight as of 2/24/23: 83.9 kg (185 lb).       See Patient Instructions    Maine Melendez MD  [facility]    Subjective   Madiha is a 33 year old, presenting for the following health issues:  URI    URI    History of Present Illness       Reason for visit:  Sore throat that feels swollen, headache, upset stomach, " stuffed up  Symptom onset:  3-7 days ago  Symptoms include:  Sore throat that feels swollen, headache, upset stomach, stuffed up  Symptom intensity:  Moderate  Symptom progression:  Improving  Had these symptoms before:  No  What makes it better:  Rest, Abbe consumes 1 sweetened beverage(s) daily.She exercises with enough effort to increase her heart rate 30 to 60 minutes per day.  She exercises with enough effort to increase her heart rate 4 days per week.   She is taking medications regularly.       On Sunday she started with sore throat, was chilled, went to sleep and woke up with sweats.  Headache and sore throat all week.  No known sick contacts, but sees clients daily.      Has some nasal congestion, does have some allergies.  No fevers that she is aware of. No body aches, some nausea, denies vomiting, constipation, diarrhea. No ear pain or hearing loss.    No home COVID testing.  Using Nyquil as needed, benadryl for allergies.    Review of Systems   Constitutional, HEENT, cardiovascular, pulmonary, gi and gu systems are negative, except as otherwise noted.      Objective           Vitals:  No vitals were obtained today due to virtual visit.    Physical Exam   GENERAL: Healthy, alert and no distress  EYES: Eyes grossly normal to inspection.  No discharge or erythema, or obvious scleral/conjunctival abnormalities.  RESP: No audible wheeze, cough, or visible cyanosis.  No visible retractions or increased work of breathing.    SKIN: Visible skin clear. No significant rash, abnormal pigmentation or lesions.  NEURO: Cranial nerves grossly intact.  Mentation and speech appropriate for age.  PSYCH: Mentation appears normal, affect normal/bright, judgement and insight intact, normal speech and appearance well-groomed.            Video-Visit Details    Type of service:  Video Visit     Originating Location (pt. Location): Home    Distant Location (provider location):  Off-site  Platform used for Video Visit:  AmWell

## 2023-05-27 LAB — SARS-COV-2 RNA RESP QL NAA+PROBE: NEGATIVE

## 2023-11-11 ENCOUNTER — MYC MEDICAL ADVICE (OUTPATIENT)
Dept: FAMILY MEDICINE | Facility: CLINIC | Age: 33
End: 2023-11-11
Payer: COMMERCIAL

## 2023-11-11 NOTE — LETTER
November 20, 2023      Madiha Maldonado  [address] [place] [place] [address]          Hello,     We care about your health and have reviewed your health plan including your medical conditions, medications, and lab results.  Based on this review, it is recommended that you follow up regarding the following health topic(s):  -Asthma  -Wellness (Physical) Visit   -Immunizations:       Health Maintenance Due   Topic Date Due    Pneumococcal Vaccine: Pediatrics (0 to 5 Years) and At-Risk Patients (6 to 64 Years) (1 - PCV) Never done    INFLUENZA VACCINE (1) 09/01/2023    COVID-19 Vaccine (3 - 2023-24 season) 09/01/2023         We recommend you take the following action(s):  -schedule a WELLNESS (Physical) APPOINTMENT.  We will perform the following labs: none at this time.   -Complete and return the attached ASTHMA CONTROL TEST.  If your total score is 19 or less or you have been to the ER or urgent care for your asthma, then please schedule an asthma followup appointment.     Please call us at the [facility] [phone] (or use [employer]) to address the above recommendations.  If you are no longer a patient with us please give us the name of the clinic and/or provider you have transferred your care to so we may update our records and we will no longer reach out to you.     Thank you for trusting [facility] and we appreciate the opportunity to serve you.  We look forward to supporting your healthcare needs in the future.     Healthy Regards,     Your Health Care Team at [facility]

## 2023-11-11 NOTE — TELEPHONE ENCOUNTER
Patient Quality Outreach    Patient is due for the following:   Asthma  -  ACT needed  Physical Preventive Adult Physical,  - Due after 1/2/24      Topic Date Due    Pneumococcal Vaccine (1 - PCV) Never done    Flu Vaccine (1) 09/01/2023    COVID-19 Vaccine (3 - 2023-24 season) 09/01/2023       Next Steps:   Schedule a Adult Preventative  Patient was assigned appropriate questionnaire to complete    Type of outreach:    Sent [employer] message.  Call in 1 week to schedule if not read/completed then send letter in 1 week if no return call or mychart read/completed    Questions for provider review:    None           Lizett Meneses, [facility]  Chart routed to Care Team.

## 2024-02-11 ENCOUNTER — MYC MEDICAL ADVICE (OUTPATIENT)
Dept: FAMILY MEDICINE | Facility: CLINIC | Age: 34
End: 2024-02-11
Payer: COMMERCIAL

## 2024-02-11 NOTE — LETTER
February 19, 2024      Madiha Maldonado  [address] [place] [address]              Dear Madiha,        We care about your health and have reviewed your health plan including your medical conditions, medications, and lab results.  Based on this review, it is recommended that you follow up regarding the following health topic(s):  -Asthma  -Cervical Cancer Screening  -Wellness (Physical) Visit   -Immunizations:       Health Maintenance Due   Topic Date Due    Pneumococcal Vaccine: Pediatrics (0 to 5 Years) and At-Risk Patients (6 to 64 Years) (1 of 2 - PCV) Never done    INFLUENZA VACCINE (1) 09/01/2023    COVID-19 Vaccine (3 - 2023-24 season) 09/01/2023         We recommend you take the following action(s):  -schedule a WELLNESS (Physical) APPOINTMENT.  We will perform the following labs: glucose and PAP.  -schedule a PAP SMEAR EXAM which is due.  Please disregard this reminder if you have had this exam elsewhere within the last year.  It would be helpful for us to have a copy of your recent pap smear report to update your records.   -Complete and return the attached ASTHMA CONTROL TEST.  If your total score is 19 or less or you have been to the ER or urgent care for your asthma, then please schedule an asthma followup appointment.     Please call us at the [facility] [phone] (or use [employer]) to address the above recommendations.  If you are no longer a patient with us please give us the name of the clinic and/or provider you have transferred your care to so we may update our records and we will no longer reach out to you.     Thank you for trusting [facility] and we appreciate the opportunity to serve you.  We look forward to supporting your healthcare needs in the future.     Healthy Regards,     Your Health Care Team at [facility]

## 2024-02-11 NOTE — TELEPHONE ENCOUNTER
Patient Quality Outreach    Patient is due for the following:   Asthma  -  ACT needed and AAP  Cervical Cancer Screening - PAP Needed  Physical Preventive Adult Physical      Topic Date Due    Pneumococcal Vaccine (1 of 2 - PCV) Never done    Flu Vaccine (1) 09/01/2023    COVID-19 Vaccine (3 - 2023-24 season) 09/01/2023       Next Steps:   Schedule a Adult Preventative  Patient was assigned appropriate questionnaire to complete    Type of outreach:    Sent [employer] message.  Call in 1 week if not read/completed; send letter in 2 weeks if not returned/read.       Questions for provider review:    Update AAP           Lizett Meneses, [facility]  Chart routed to Care Team.

## 2024-02-12 ENCOUNTER — PATIENT OUTREACH (OUTPATIENT)
Dept: FAMILY MEDICINE | Facility: CLINIC | Age: 34
End: 2024-02-12
Payer: COMMERCIAL

## 2024-02-12 DIAGNOSIS — R87.810 CERVICAL HIGH RISK HPV (HUMAN PAPILLOMAVIRUS) TEST POSITIVE: Primary | ICD-10-CM

## 2024-02-17 ENCOUNTER — HEALTH MAINTENANCE LETTER (OUTPATIENT)
Age: 34
End: 2024-02-17

## 2024-04-08 NOTE — TELEPHONE ENCOUNTER
Julissa Dugan,     CONNORI to provider - Patient is lost to pap tracking follow-up. Attempts to contact pt have been made per reminder process and there has been no reply and/or no appt scheduled. Contact hx listed below.     4/17/18 NIL Pap, Neg HR HPV.   1/2/23 NIL pap, + HR HPV # 18. Plan: [facility] bef 4/2/23 2/24/23 colp ECC: no CATHERINE. Plan: [facility] in 1 yr.   02/12/24 Reminder Mychart  03/12/24 Reminder call - left message  4/8/24 Lost to follow-up for pap tracking    HYACINTH Uribe, RN-BC

## 2024-05-28 ENCOUNTER — MYC MEDICAL ADVICE (OUTPATIENT)
Dept: FAMILY MEDICINE | Facility: CLINIC | Age: 34
End: 2024-05-28
Payer: COMMERCIAL

## 2024-05-28 NOTE — TELEPHONE ENCOUNTER
Patient Quality Outreach    Patient is due for the following:   Asthma  -  ACT needed and AAP  Cervical Cancer Screening - PAP Needed  Physical Preventive Adult Physical-glucose      Topic Date Due    Pneumococcal Vaccine (1 of 2 - PCV) Never done    COVID-19 Vaccine (3 - 2023-24 season) 09/01/2023       Next Steps:   Schedule a Adult Preventative  Patient was assigned appropriate questionnaire to complete    Type of outreach:    Sent [employer] message.  Call in 1 week if not read/completed; send letter in 2 weeks if not returned/read.       Questions for provider review:    Update AAP           Lizett Meneses, [facility]  Chart routed to Care Team and provider.

## 2024-05-28 NOTE — LETTER
June 11, 2024      Madiha Maldonado  [address] [place] [address]              Dear Madiha,      We care about your health and have reviewed your health plan including your medical conditions, medications, and lab results.  Based on this review, it is recommended that you follow up regarding the following health topic(s):  -Asthma  -Cervical Cancer Screening  -Wellness (Physical) Visit   -Immunizations:       Health Maintenance Due   Topic Date Due    Pneumococcal Vaccine: Pediatrics (0 to 5 Years) and At-Risk Patients (6 to 64 Years) (1 of 2 - PCV) Never done    COVID-19 Vaccine (3 - 2023-24 season) 09/01/2023      We recommend you take the following action(s):  -schedule a WELLNESS (Physical) APPOINTMENT.  We will perform the following labs: glucose.  -schedule a PAP SMEAR EXAM which is due.  Please disregard this reminder if you have had this exam elsewhere within the last year.  It would be helpful for us to have a copy of your recent pap smear report to update your records.   -Complete and return the attached ASTHMA CONTROL TEST.  If your total score is 19 or less or you have been to the ER or urgent care for your asthma, then please schedule an asthma followup appointment.     Please call us at the [facility] [phone] (or use [employer]) to address the above recommendations.        If you are no longer a patient with us please give us the name of the clinic and/or provider you have transferred your care to so we may update our records and we will no longer reach out to you.     Thank you for trusting [facility] and we appreciate the opportunity to serve you.  We look forward to supporting your healthcare needs in the future.     Healthy Regards,     Your Health Care Team at [facility]

## 2024-06-30 ENCOUNTER — PATIENT OUTREACH (OUTPATIENT)
Dept: CARE COORDINATION | Facility: CLINIC | Age: 34
End: 2024-06-30
Payer: COMMERCIAL

## 2025-03-08 ENCOUNTER — HEALTH MAINTENANCE LETTER (OUTPATIENT)
Age: 35
End: 2025-03-08